# Patient Record
Sex: FEMALE | Race: WHITE | Employment: OTHER | URBAN - METROPOLITAN AREA
[De-identification: names, ages, dates, MRNs, and addresses within clinical notes are randomized per-mention and may not be internally consistent; named-entity substitution may affect disease eponyms.]

---

## 2017-06-01 ENCOUNTER — APPOINTMENT (OUTPATIENT)
Dept: RADIATION ONCOLOGY | Facility: HOSPITAL | Age: 72
End: 2017-06-01
Attending: RADIOLOGY
Payer: MEDICARE

## 2017-07-01 ENCOUNTER — APPOINTMENT (OUTPATIENT)
Dept: RADIATION ONCOLOGY | Facility: HOSPITAL | Age: 72
End: 2017-07-01
Attending: RADIOLOGY
Payer: MEDICARE

## 2017-07-06 ENCOUNTER — OFFICE VISIT (OUTPATIENT)
Dept: RADIATION ONCOLOGY | Facility: HOSPITAL | Age: 72
End: 2017-07-06
Attending: RADIOLOGY
Payer: MEDICARE

## 2017-07-06 VITALS
HEIGHT: 62 IN | HEART RATE: 64 BPM | WEIGHT: 168.38 LBS | SYSTOLIC BLOOD PRESSURE: 128 MMHG | RESPIRATION RATE: 18 BRPM | DIASTOLIC BLOOD PRESSURE: 85 MMHG | TEMPERATURE: 98 F | BODY MASS INDEX: 30.99 KG/M2

## 2017-07-06 PROCEDURE — 99212 OFFICE O/P EST SF 10 MIN: CPT

## 2017-07-06 RX ORDER — LORAZEPAM 0.5 MG/1
0.5 TABLET ORAL EVERY 4 HOURS PRN
Qty: 10 TABLET | Refills: 0 | Status: SHIPPED | OUTPATIENT
Start: 2017-07-06 | End: 2018-02-15

## 2017-07-06 RX ORDER — LEVOTHYROXINE SODIUM 0.1 MG/1
100 TABLET ORAL
COMMUNITY

## 2017-07-06 RX ORDER — ASPIRIN 325 MG
325 TABLET ORAL EVERY 4 HOURS PRN
COMMUNITY

## 2017-07-06 RX ORDER — SIMVASTATIN 40 MG
40 TABLET ORAL NIGHTLY
COMMUNITY

## 2017-07-06 RX ORDER — IBUPROFEN 200 MG
200 TABLET ORAL EVERY 6 HOURS PRN
COMMUNITY

## 2017-07-06 NOTE — PROGRESS NOTES
Nursing Consultation Note  Patient: Maximo Santillan  YOB: 1945  Age: 71year old  Radiation Oncologist: Dr. Jean De La Rosa  Referring Physician: Jett Carrera  Diagnosis:No diagnosis found.   Consult Date: 7/6/2017      Chemotherapy: no  Labs Allergies:    Erythromycin [Emcin*    Other (See Comments)    Comment:Cramping,nausea  Penicillins                 Comment:DIZZY/NAUSEA  Tetracycline Base       Nausea only      Current Outpatient Prescriptions:  simvastatin 40 MG Oral Tab Take 40 mg Topics Concern   None on file     Social History Narrative   None on file       ECOG:  Grade 0 - Fully active, able to carry on all predisease activities without restrictions. Education:  y    Are ADL's met?   Yes  Does patient feel safe in their envir

## 2017-07-06 NOTE — CONSULTS
RADIATION ONCOLOGY NOTE    DATE OF VISIT: 7/6/2017    DIAGNOSIS: Anterior midline falx meningioma, for definitive Cyberknife radiosurgery    Dear Marquez Perez and colleagues,    Thank you for asking us to see Mrs. Weir Michael.   As you recall, she i EACH EYE BID-TID PRN Disp: 10 CC Rfl: 3   NASONEX 50 MCG/ACT NA SUSP 2 SPRAYS EACH NOSTRIL QD PRN Disp: 1 Rfl: 5       ALLERGIES :     Erythromycin [Emcin*    Other (See Comments)    Comment:Cramping,nausea  Penicillins                 Comment:DIZZY/NAUSEA midline falx meningioma, for definitive Cyberknife radiosurgery    She has a history of chronic HA, who has been followed radiographically for history of meningioma.   More recently, she has radiographic and clinical progression with 5/18/17 MRI revealed in

## 2017-07-06 NOTE — PROGRESS NOTES
Knowledge Deficit Plan Of Care:    Problem:  Knowledge Deficit    Problems related to:    Stereotactic Radiosurgery    Interventions:  Assess patient knowledge level  Show Cyberknife video presentation  Assess knowledge needs  Instruct on treatment plannin

## 2017-07-06 NOTE — PROGRESS NOTES
Primary language:  English  Language line required?  no  Comprehension Ability:  excellent  Able to read?  yes  Able to write? yes  Communication tools:  na  Patient's ability to learn:  na  Readiness to learn:   Motivated  Learning preferences:  Discussio

## 2017-07-11 PROCEDURE — 77334 RADIATION TREATMENT AID(S): CPT | Performed by: RADIOLOGY

## 2017-07-11 PROCEDURE — 77470 SPECIAL RADIATION TREATMENT: CPT | Performed by: RADIOLOGY

## 2017-07-11 PROCEDURE — 77290 THER RAD SIMULAJ FIELD CPLX: CPT | Performed by: RADIOLOGY

## 2017-07-12 PROCEDURE — 77293 RESPIRATOR MOTION MGMT SIMUL: CPT | Performed by: RADIOLOGY

## 2017-07-12 PROCEDURE — 77295 3-D RADIOTHERAPY PLAN: CPT | Performed by: RADIOLOGY

## 2017-07-21 ENCOUNTER — OFFICE VISIT (OUTPATIENT)
Dept: RADIATION ONCOLOGY | Facility: HOSPITAL | Age: 72
End: 2017-07-21
Attending: RADIOLOGY
Payer: MEDICARE

## 2017-07-21 VITALS
BODY MASS INDEX: 31 KG/M2 | SYSTOLIC BLOOD PRESSURE: 142 MMHG | HEART RATE: 64 BPM | TEMPERATURE: 98 F | DIASTOLIC BLOOD PRESSURE: 77 MMHG | WEIGHT: 167.81 LBS | RESPIRATION RATE: 16 BRPM | OXYGEN SATURATION: 93 %

## 2017-07-21 PROCEDURE — 77280 THER RAD SIMULAJ FIELD SMPL: CPT | Performed by: RADIOLOGY

## 2017-07-21 PROCEDURE — 77334 RADIATION TREATMENT AID(S): CPT | Performed by: RADIOLOGY

## 2017-07-21 PROCEDURE — 77300 RADIATION THERAPY DOSE PLAN: CPT | Performed by: RADIOLOGY

## 2017-07-21 PROCEDURE — 77372 SRS LINEAR BASED: CPT | Performed by: RADIOLOGY

## 2017-07-21 RX ORDER — DEXAMETHASONE 2 MG/1
2 TABLET ORAL
Qty: 7 TABLET | Refills: 1 | Status: SHIPPED | OUTPATIENT
Start: 2017-07-21 | End: 2018-02-15

## 2017-07-21 NOTE — PROGRESS NOTES
CYBERKNIFE/ RADIATION ONCOLOGY COMPLETION SUMMARY NOTE    DIAGNOSIS: Anterior midline falx meningioma, for definitive Cyberknife radiosurgery     Dear Marquez Vee and colleagues,    As you recall,  Mrs. Anni Decker.   As you recall, she is a 72 yo

## 2017-07-21 NOTE — PROGRESS NOTES
[facility] Radiation Treatment Management Note 1-5    Patient:  Maximo Santillan  Age:  71year old  Visit Diagnosis:  No diagnosis found.   Primary Rad/Onc:  Dr. Lily Damian Zarate    Site Delivered Dose (Gy) Prescribed Dose (Gy) Fraction #

## 2017-07-26 PROCEDURE — 77336 RADIATION PHYSICS CONSULT: CPT | Performed by: RADIOLOGY

## 2017-08-04 NOTE — OPERATIVE REPORT
[facility]    PATIENT'S NAME: Pearl Brewster   ATTENDING PHYSICIAN: Delaney Stevens MD   OPERATING PHYSICIAN: Familia Coreas MD   PATIENT ACCOUNT#:   [de-identified]    LOCATION:  [address] [place] [address] RECORD #:   [id number]       DATE OF BIRTH:  0

## 2017-08-29 ENCOUNTER — TELEPHONE (OUTPATIENT)
Dept: RADIATION ONCOLOGY | Facility: HOSPITAL | Age: 72
End: 2017-08-29

## 2017-11-08 ENCOUNTER — APPOINTMENT (OUTPATIENT)
Dept: RADIATION ONCOLOGY | Facility: HOSPITAL | Age: 72
End: 2017-11-08
Attending: RADIOLOGY
Payer: MEDICARE

## 2018-02-15 ENCOUNTER — OFFICE VISIT (OUTPATIENT)
Dept: RADIATION ONCOLOGY | Facility: HOSPITAL | Age: 73
End: 2018-02-15
Attending: RADIOLOGY
Payer: MEDICARE

## 2018-02-15 VITALS
OXYGEN SATURATION: 93 % | RESPIRATION RATE: 16 BRPM | WEIGHT: 170.19 LBS | DIASTOLIC BLOOD PRESSURE: 83 MMHG | SYSTOLIC BLOOD PRESSURE: 161 MMHG | BODY MASS INDEX: 31 KG/M2 | HEART RATE: 62 BPM | TEMPERATURE: 98 F

## 2018-02-15 PROCEDURE — 99211 OFF/OP EST MAY X REQ PHY/QHP: CPT

## 2018-02-15 NOTE — PROGRESS NOTES
RADIATION ONCOLOGY NOTE    DATE OF VISIT: 2/15/2018    DIAGNOSIS: Anterior midline falx meningioma, for s/p Cyberknife radiosurgery on 7/21/2017, with radiographic stable dz.      Dear Marquez Shi and colleagues,    As you recall,  Mrs. Mikayla Penaloza of Arthritis; Hypertension; Meningioma ([facility] [address] and Thyroid disease. PAST SURGICAL HISTORY:   has a past surgical history that includes excise cutaneous neuroma (2002) and revision of eyelid,< 1/4 lid margin (06/05/2017).     PAST SOCIAL HISTORY   reports Oncology  [email or web address]  [phone].    2/15/2018

## 2018-02-15 NOTE — PROGRESS NOTES
Pt came in post MRI for review of the results. Medical history reviewed. Medications reviewed. Pt b/p is high and states she just took her b/p meds, stated she took them minutes ago. She monitors her b/p at home.  Denies any headaches, dizziness, or katarzyna

## 2022-09-26 ENCOUNTER — LAB REQUISITION (OUTPATIENT)
Dept: SURGERY | Age: 77
End: 2022-09-26

## 2022-09-26 DIAGNOSIS — Z01.818 PREOP EXAMINATION: ICD-10-CM

## 2022-09-27 LAB — SARS-COV-2 RNA RESP QL NAA+PROBE: NOT DETECTED

## 2022-10-11 ENCOUNTER — LAB REQUISITION (OUTPATIENT)
Dept: SURGERY | Age: 77
End: 2022-10-11
Payer: MEDICARE

## 2022-10-11 DIAGNOSIS — Z01.818 PREOP EXAMINATION: ICD-10-CM

## 2022-10-12 LAB — SARS-COV-2 RNA RESP QL NAA+PROBE: NOT DETECTED

## 2025-03-11 ENCOUNTER — TELEPHONE (OUTPATIENT)
Age: 80
End: 2025-03-11

## 2025-03-11 ENCOUNTER — TELEPHONE (OUTPATIENT)
Dept: RADIATION ONCOLOGY | Facility: HOSPITAL | Age: 80
End: 2025-03-11

## 2025-03-11 NOTE — TELEPHONE ENCOUNTER
Patient called and needs an appointment with Dr Zarate for clearance to get an implant.  Naseem call her back.  Thank  you.

## 2025-03-11 NOTE — TELEPHONE ENCOUNTER
Patient called to ask if if she can can have dental work (implants) done with her history of Cyberknife in 2017. RN spoke to Dr. Zaraet, who said it is totally fine. Relayed this response to patient and told her if she needs a note from Dr. Zarate, We can provide it. Patient states her understanding and was thankful for the call.